# Patient Record
Sex: MALE | URBAN - METROPOLITAN AREA
[De-identification: names, ages, dates, MRNs, and addresses within clinical notes are randomized per-mention and may not be internally consistent; named-entity substitution may affect disease eponyms.]

---

## 2023-07-10 ENCOUNTER — OFFICE VISIT (OUTPATIENT)
Dept: FAMILY MEDICINE | Facility: CLINIC | Age: 53
End: 2023-07-10

## 2023-07-10 VITALS
SYSTOLIC BLOOD PRESSURE: 160 MMHG | RESPIRATION RATE: 16 BRPM | DIASTOLIC BLOOD PRESSURE: 91 MMHG | HEART RATE: 71 BPM | TEMPERATURE: 98.6 F | BODY MASS INDEX: 26.29 KG/M2 | OXYGEN SATURATION: 98 % | WEIGHT: 154 LBS | HEIGHT: 64 IN

## 2023-07-10 DIAGNOSIS — I10 PRIMARY HYPERTENSION: Primary | ICD-10-CM

## 2023-07-10 DIAGNOSIS — R73.9 ELEVATED RANDOM BLOOD GLUCOSE LEVEL: ICD-10-CM

## 2023-07-10 DIAGNOSIS — E11.9 TYPE 2 DIABETES MELLITUS WITHOUT COMPLICATION, WITHOUT LONG-TERM CURRENT USE OF INSULIN (H): ICD-10-CM

## 2023-07-10 PROCEDURE — 82570 ASSAY OF URINE CREATININE: CPT

## 2023-07-10 NOTE — Clinical Note
Hi could you please sign the note from 7/10 for this patient? Thank you! [email or web address]

## 2023-07-11 LAB
CREAT UR-MCNC: 200 MG/DL
MICROALBUMIN UR-MCNC: 16.5 MG/L
MICROALBUMIN/CREAT UR: 8.25 MG/G CR (ref 0–17)

## 2023-07-11 NOTE — PROGRESS NOTES
MEDICINE NOTE    SUBJECTIVE:    Mr. Clarke is a 53 year old male who presents to [facility] today for an elevated blood sugar reading. His non-fasting blood sugar was 200 this morning measured at the [facility]. He reports his blood sugar one year ago was 180. He has noted he is urinating more at night and maybe more so during the day but he is not sure. He also received a dose of the COVID-19 vaccine booster today at the [facility] today.     He reports a medical history of high blood pressure readings but has never been on medication.  He denies dyspnea on exertion, edema or paresthesias or numbness in his legs. He admits to having brief episodes of chest pain. Unfortunately, we did not have a  in clinic and had to rely on a phone  and this part of the history was not well delineated.  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 He has not been working recently has he had a fall at work and [place] [facility] [place]. He still reports some diminished sensation in this left hand and feet. His family history is a bit uncertain also but he may have diabetes in his parents but not his siblings.    He lives with a son and nephew and he takes part in the grocery shopping and cooking with his housemates. His other complaint is itchy eyes for the last 3 months. He denies exposure to any new allergens or animals. He has a history of dry eyes       REVIEW OF SYSTEMS:  Gen: no fevers, night sweats or weight change  Eyes: no vision change, diplopia or red eyes  Ears, Noses, Mouth, Throat: no ringing in ears or hearing change, no epistaxis or nasal discharge, no oral lesions, throat clear  Cardiac: no chest pain, palpitations, or pain with walking  Lungs: no dyspnea, cough, or shortness of breath  GI: no nausea, vomiting, diarrhea or constipation, no abdominal pain  : no change in urine, hematuria, or sexual  "dysfunction  Musculoskeletal: no joint or muscle pain or swelling  Skin: no concerning lesions or moles  Neuro: no loss of strength or sensation, no numbness or tingling, no tremor  Endo: no polyuria or polydipsia, no temperature intolerance  Heme/Lymph: no concerning bumps, no bleeding problems  Allergy: no environmental or drug allergies  Psych: no depression or anxiety    Past Medical History:  No medical history to report    Surgical history:  Posterior C3-C7 Laminectomy and fusion (8/12/2021)    Family History:   Mother: Diabetes  Father: Diabetes     Social History:   Maxwell lives with his son and nephew. He denies smoking marijuana or tobacco and illicit drug use. He drinks six beers a week. He denies difficulty in purchasing food.     OBJECTIVE:  Physical Exam:  BP (!) 160/91 (BP Location: Right arm, Patient Position: Chair, Cuff Size: Adult Regular)   Pulse 71   Temp 98.6  F (37  C)   Resp 16   Ht 1.57 m (5' 1.81\")   Wt 69.9 kg (154 lb)   SpO2 98%   BMI 28.34 kg/m    Repeat blood pressure was 150/85 in his right arm, sitting    Constitutional: no distress, comfortable, pleasant   Eyes: anicteric, normal extra-ocular movements, sclera are not red   .   Cardiovascular: regular rate and rhythm, normal S1 and S2, no murmurs, rubs or gallops, peripheral pulses full and symmetric no edema  Respiratory: clear to auscultation and precussion      Skin: well healed scar on his upper thoracic and lower cervical spine area. The skin on his feet is normal, no callouses or hot spots and some intertriginous maceration. Dermal appendages are intact. We did not have access to a Ten gram monofilament so that needs to be done at his next visit.     Psychological: appropriate mood   Lymphatic: no cervical  lymphadenopathy    ASSESSMENT/PLAN:  -History of high blood glucose readings outside this clinic. This is possibly Type II -Diabetes Mellitus  -Elevated blood pressure readings  -Itchy eyes, most likely due to the " recent air pollution    Plan: We were unable to draw the patients labs tonight. Patient was advised to come back to [facility] on Monday. At that time, we will check a CMP, CBC, A1C, Microalbumin and lipid panel. Once those lab results are back, we will notify him by phone.  He is given a home blood pressure monitor and instructions on how to use it and asked to bring in a log of his readings over the next month.  He is asked to RTC in a month.  If his A1C is high and his renal function adequate, he will be started on Metformin  He is advised to  lubricating eye drops to treat his itchy eyes.   Further risk factor treatment will be started after his results are back. Patient also spoke with the nutrionist [facility] for maintaining a healthy diet and lifestyle.     Med Clinician: DENICE Weber   Preceptor: Galen Lewis MD    In supervising the medical student, I repeated the exam documented above.  I have reviewed and verified the student s documentation.  Supervising Provider: Galen Lewis M.D.

## 2023-07-11 NOTE — NURSING NOTE
"[facility] Nursing Progress Note    Chief complaint: Pts non fasting blood sugar was 200 at his clinic this morning and was directed here. He also stated his eyes have been bothering him more recently. Pt has a history of hypertension, but has never taken any medication.     Blood pressure cuff being lended for pt to monitor BP at home. Education provided. Forms given. Pt was able to demonstrate proper BP reading.      Objective:  BP (!) 160/91 (BP Location: Right arm, Patient Position: Chair, Cuff Size: Adult Regular)   Pulse 71   Temp 98.6  F (37  C)   Resp 16   Ht 1.62 m (5' 3.78\")   Wt 69.9 kg (154 lb)   SpO2 98%   BMI 26.62 kg/m      Social History:    PHQ Score:    PHQ2 : 0    Nutrition Screen:   Pt reports sometimes not being able to afford balanced meals.    Referral to nutrition needed?:    Yes.      Nursing Clinician: Jessie Goetz  Nursing Preceptor: Sirisha Monge RN        "

## 2023-07-11 NOTE — PATIENT INSTRUCTIONS
Resumen de la Visita    Nombre del Paciente: Maxwell Clarke  MRN: [id number]    Fecha de la [place] 10 de chica, 2023     Dianostico medico principal: Esperando por los resultados de laboratorio    Recomendaciones/Instrucciones del Medico: Hoy vamos a recoger laboratorios y llamar a usted en 1-2 acevedo con los resultados. Queremos obtenerlos antes de contiuar con tratamiento. Le damos un monitor de presion aterial hoy. Por favor tome leila prueba de presion arterial 3-4 veces por semana mientras esta sentado. Escriba los numeros en papel o en marcus telefono y traerlos en marcus siguiente visita. Regrese al [facility] en 3 o 4 semanas con los numeros y el monitor para devolverlo. Tambien simón la informacion del nutricionista por favor.    Estudios de Laboratorio: hemograma comopleto, prueba de microalbumina en orina, panel de lipidos, hemoglobina A1c, panel metabolico completo    Seguimiento/Resultados: Vamos a llamarle en 1 o 2 acevedo con los resultados. Es muy importante contestas la llamada. Es posible que el judy es de telefono fuera del area.     Medico: Dr. Lewis

## 2023-07-12 NOTE — PHARMACY
"[facility] Progress Note    Chief complaint: hyperglycemia. Maxwell Clarke also stated eye irritation and hypertension.    Last date of full med: 7/10/2023    Subjective:  Mr. Clarke is a 53yoM coming in because his non fasting blood sugar when checked this morning was 200 and he was directed here. He reports having eye irritation for the past 3 months, hypertension, and a family history of both his parents having diabetes. He reports no tobacco or illicit drug use. He does rarely drink coffee when it's cold and drinks about 6 beers per week.    No current outpatient medications on file.         Objective:  BP (!) 160/91 (BP Location: Right arm, Patient Position: Chair, Cuff Size: Adult Regular)   Pulse 71   Temp 98.6  F (37  C)   Resp 16   Ht 1.62 m (5' 3.78\")   Wt 69.9 kg (154 lb)   SpO2 98%   BMI 26.62 kg/m        Assessment:   -History of high blood glucose readings outside this clinic. This is possibly Type II -Diabetes Mellitus  -Elevated blood pressure readings  -Itchy eyes, most likely due to the recent air pollution    DTP: Indication- needs additional drug therapy for hypertension  Rationale: The patient reports a history of hypertension and has a reading >140/90 mm Hg indicative of hypertension per the 2017 ACC/AHA HTN guidelines which recommend that the patient should take medications to manage his hypertension.      DTP: Indication - needs additional drug therapy for high A1C  Rationale: If the patient has a high A1C of over 6.5 this would be indicative of him having diabetes that should be treated per the ADA / FDA guidelines .    Plan:  1. Draw labs for CMP, CBC, A1C, Microalbumin, and a lipid panel to assess pts health. If his A1C is high and his renal function adequate, he will be started on Metformin.  2. Use lubricating eye drops for ocular irritation  3. RTC in one month after using home blood pressure monitor and bring readings.    Follow-Up:  Pt to return in 1 month to discuss lab " values and blood pressure readings. If appropriate, medications for hypertension, diabetes and high cholesterol may be started.    Pharmacy Clinician: Steffany Crawford  Pharmacy Preceptor: Imani Mcneal RPH    _____________________________  Preceptor Use Only:  In supervising the student, I have reviewed and verified the student's documentation and found it to be correct and complete.   Preceptor Signature: Imnai Mcneal PharmD, BCIDP

## 2023-07-13 ENCOUNTER — LAB (OUTPATIENT)
Dept: FAMILY MEDICINE | Facility: CLINIC | Age: 53
End: 2023-07-13

## 2023-07-13 DIAGNOSIS — R73.9 ELEVATED RANDOM BLOOD GLUCOSE LEVEL: ICD-10-CM

## 2023-07-13 DIAGNOSIS — I10 PRIMARY HYPERTENSION: ICD-10-CM

## 2023-07-13 PROCEDURE — 83036 HEMOGLOBIN GLYCOSYLATED A1C: CPT

## 2023-07-13 PROCEDURE — 80053 COMPREHEN METABOLIC PANEL: CPT

## 2023-07-13 PROCEDURE — 85027 COMPLETE CBC AUTOMATED: CPT

## 2023-07-13 PROCEDURE — 80061 LIPID PANEL: CPT

## 2023-07-14 LAB
ALBUMIN SERPL BCG-MCNC: 4.4 G/DL (ref 3.5–5.2)
ALP SERPL-CCNC: 114 U/L (ref 40–129)
ALT SERPL W P-5'-P-CCNC: 47 U/L (ref 0–70)
ANION GAP SERPL CALCULATED.3IONS-SCNC: 12 MMOL/L (ref 7–15)
AST SERPL W P-5'-P-CCNC: 39 U/L (ref 0–45)
BILIRUB SERPL-MCNC: 0.4 MG/DL
BUN SERPL-MCNC: 9.8 MG/DL (ref 6–20)
CALCIUM SERPL-MCNC: 9.6 MG/DL (ref 8.6–10)
CHLORIDE SERPL-SCNC: 100 MMOL/L (ref 98–107)
CHOLEST SERPL-MCNC: 205 MG/DL
CREAT SERPL-MCNC: 0.6 MG/DL (ref 0.67–1.17)
DEPRECATED HCO3 PLAS-SCNC: 25 MMOL/L (ref 22–29)
ERYTHROCYTE [DISTWIDTH] IN BLOOD BY AUTOMATED COUNT: 11.9 % (ref 10–15)
GFR SERPL CREATININE-BSD FRML MDRD: >90 ML/MIN/1.73M2
GLUCOSE SERPL-MCNC: 230 MG/DL (ref 70–99)
HBA1C MFR BLD: 7.6 %
HCT VFR BLD AUTO: 43 % (ref 40–53)
HDLC SERPL-MCNC: 43 MG/DL
HGB BLD-MCNC: 14.9 G/DL (ref 13.3–17.7)
LDLC SERPL CALC-MCNC: 116 MG/DL
MCH RBC QN AUTO: 32.3 PG (ref 26.5–33)
MCHC RBC AUTO-ENTMCNC: 34.7 G/DL (ref 31.5–36.5)
MCV RBC AUTO: 93 FL (ref 78–100)
NONHDLC SERPL-MCNC: 162 MG/DL
PLATELET # BLD AUTO: 168 10E3/UL (ref 150–450)
POTASSIUM SERPL-SCNC: 3.8 MMOL/L (ref 3.4–5.3)
PROT SERPL-MCNC: 7.3 G/DL (ref 6.4–8.3)
RBC # BLD AUTO: 4.61 10E6/UL (ref 4.4–5.9)
SODIUM SERPL-SCNC: 137 MMOL/L (ref 136–145)
TRIGL SERPL-MCNC: 232 MG/DL
WBC # BLD AUTO: 6.1 10E3/UL (ref 4–11)

## 2023-07-18 PROBLEM — E11.9 DIABETES MELLITUS, TYPE 2 (H): Status: ACTIVE | Noted: 2023-07-18

## 2023-07-24 ENCOUNTER — DOCUMENTATION ONLY (OUTPATIENT)
Dept: FAMILY MEDICINE | Facility: CLINIC | Age: 53
End: 2023-07-24

## 2023-07-24 NOTE — PROGRESS NOTES
I have tried reaching Mr. Clarke by telephone ([phone]) multiple times and have been unable to reach him. His most recent A1C indicates he has diabetes and he needs to start Metformin. He also needs to start a statin due to his elevated triglycerides. This case was discussed with Dr. Galen Lewis. I will continue to reach out to Mr. Clarke.     DENICE Weber  [facility] Medical Student, MS2  [email or web address]

## 2023-09-21 ENCOUNTER — OFFICE VISIT (OUTPATIENT)
Dept: FAMILY MEDICINE | Facility: CLINIC | Age: 53
End: 2023-09-21

## 2023-09-21 DIAGNOSIS — I10 BENIGN ESSENTIAL HYPERTENSION: Primary | ICD-10-CM

## 2023-09-21 DIAGNOSIS — E11.9 DIABETES MELLITUS WITHOUT COMPLICATION (H): ICD-10-CM

## 2023-09-21 DIAGNOSIS — E11.9 TYPE 2 DIABETES MELLITUS (H): ICD-10-CM

## 2023-09-21 DIAGNOSIS — E78.5 HYPERLIPIDEMIA LDL GOAL <100: ICD-10-CM

## 2023-09-21 RX ORDER — LOSARTAN POTASSIUM 25 MG/1
25 TABLET ORAL DAILY
Qty: 30 TABLET | Refills: 0 | Status: SHIPPED | OUTPATIENT
Start: 2023-09-21 | End: 2024-01-25

## 2023-09-21 RX ORDER — ATORVASTATIN CALCIUM 20 MG/1
10 TABLET, FILM COATED ORAL DAILY
Qty: 15 TABLET | Refills: 0 | Status: SHIPPED | OUTPATIENT
Start: 2023-09-21 | End: 2024-01-25

## 2023-09-22 NOTE — PROGRESS NOTES
[facility] Pharmacy Progress Note    Chief complaint: Hypertension    Last date of full med: 7/10    Subjective:  Luis presents to the clinic with a blood pressure log and requests medications to treat the hypertension. He was given a blood pressure cuff from [facility] at his last visit and he takes his blood pressures in the morning. He averaged 140-160 mmHg systolic and 80-95 mmHg diastolic.     At his last visit, labs for A1c were drawn and he had an A1c of 7.6% and a spot blood glucose of 230 mg/dL. For his newly diagnosed diabetes, he was prescribed metformin 500 mg 1 tablet by mouth BID. He stated that he had not yet picked up or started this medication yet. He has no history of previous clinical ASCVD or cardiac events.     He has no family history of heart disease or any other cardiovascular disease. He does have a family history of T2DM. Current diet and exercise habits were not assessed at this visit.      Current Outpatient Medications   Medication Sig Dispense Refill     atorvastatin (LIPITOR) 20 MG tablet Take 0.5 tablets (10 mg) by mouth daily 15 tablet 0     losartan (COZAAR) 25 MG tablet Take 1 tablet (25 mg) by mouth daily 30 tablet 0     metFORMIN (GLUCOPHAGE) 500 MG tablet Take 1 tablet (500 mg) by mouth 2 times daily (with meals) 60 tablet 4         Objective:  There were no vitals taken for this visit.    BP: 181/119 mmHg (1st); 138/86 mmHg (2nd)    Assessment:     Hypertension: Uncontrolled  He has Stage 2 Hypertension that can be treated with first line agents including ACEis, ARBs, thiazide diuretics, and CCBs per the 2017 ACC/AHA guidelines for hypertension. Most recent K+ level was 3.8 mmol/L on 7/13.    Type 2 Diabetes Mellitus: Uncontrolled  Recent, and only, A1c value of 7.6% does not meet recommended goal of <7%. Initial therapy is recommended and has already been prescribed. The patient has not yet started the medication. Patient may also benefit from dietary changes and increased  exercise.    ASCVD Prevention: Uncontrolled  Per the 2019 AHA guidelines for primary prevention, a moderate intensity statin is indicated for ASCVD prevention. Therapy options include pravastatin, simvastatin, atorvastatin, and rosuvastatin. Patient may also benefit from ASA 81 mg daily but an ASCVD risk score was not calculated and should be calculated on follow-up with new labs.    Plan:  1. Return to clinic on Monday to  medications.  2. Initiate Losartan 25 mg daily  3. Initiate atorvastatin 10 mg daily  4. Initiate metformin 500 mg twice daily  5. Recommend dietary changes and 150 minutes moderate exercise per week.    Follow-Up:  Follow-up in 1 month to assess for side effects, efficacy (blood pressure measurement), and new labs (BMP, lipid panel)    Pharmacy Clinician: Kin Richards  Pharmacy Preceptor: Caryl Coon PharmD    _____________________________  Preceptor Use Only:  In supervising the student, I have reviewed and verified the student's documentation and found it to be correct and complete.   Preceptor Signature:

## 2024-01-11 ENCOUNTER — OFFICE VISIT (OUTPATIENT)
Dept: FAMILY MEDICINE | Facility: CLINIC | Age: 54
End: 2024-01-11

## 2024-01-11 VITALS
RESPIRATION RATE: 16 BRPM | SYSTOLIC BLOOD PRESSURE: 150 MMHG | TEMPERATURE: 98.3 F | DIASTOLIC BLOOD PRESSURE: 90 MMHG | OXYGEN SATURATION: 98 % | BODY MASS INDEX: 26.46 KG/M2 | WEIGHT: 158.8 LBS | HEIGHT: 65 IN | HEART RATE: 63 BPM

## 2024-01-11 DIAGNOSIS — E78.5 HYPERLIPIDEMIA LDL GOAL <100: ICD-10-CM

## 2024-01-11 DIAGNOSIS — R00.2 PALPITATIONS: ICD-10-CM

## 2024-01-11 DIAGNOSIS — E11.9 TYPE 2 DIABETES MELLITUS WITHOUT COMPLICATION, WITHOUT LONG-TERM CURRENT USE OF INSULIN (H): Primary | ICD-10-CM

## 2024-01-11 ASSESSMENT — PAIN SCALES - GENERAL: PAINLEVEL: EXTREME PAIN (8)

## 2024-01-12 NOTE — PROGRESS NOTES
"MEDICINE NOTE    SUBJECTIVE:      Chief Complaint: Follow-up on meds, bilateral eye pain    HPI: Maxwell Clarke is a 53 year old with a recent diagnoses of diabetes and hypertension who presents to the clinic for med follow-up as well as bilateral eye pain. The patient was diagnosed with hypertension and diabetes at this clinic on 7/10/23 with A1C of 7.6% and a blood glucose of 230 mg/dL. The patient came back to the clinic on 9/21/23 for follow-up on his lab results.The client was prescribed metformin 500 mg twice a day, losartan 25 mg once a day, atorvastatin 10 mg once a day. The patient was supposed to follow up a month after starting his medications, but comes for follow-up today. The patient reports that he has taken his medications as instructed for the full month; however, his bottles still had about two weeks worth of medications. The patient denies any diarrhea, lightheadedness while he was taking the medications.The patient has also been using prednisone eye drops which he received about a year and a half ago. The patient has also been using tetracycline cream for his eye pain which he received from a family member. He reported waking up a few days ago with his left eye shut and using the prednisone eye drops helped open it up.  The patient has been having increasingly worsening eye pain, left worse than left. He rates the pain 8/10 on the left and 5/10 on the right. He has been having trouble reading due to his vision blurriness but denies having trouble with walking or falling at home.     The client walks about 10-20 daily when the weather is warmer, but has not been going on his walks once the weather has gotten cold. He reports having tachycardia and palpitations with walking as well as feeling like his chest is \"suffocating.\" The pain comes and goes, nothing alleviates or worsens it, lasts for about half an hour. He also has been having his chest symptoms with rest. The patient denies diaphoresis, " "radiating pain, lightheadedness during his chest pain episodes.    The patient has been getting 2-3 hours of sleep a night and cannot stay asleep. He denies having chest pain or palpitations upon waking up.     REVIEW OF SYSTEMS:  Gen: no fevers, no weight change  Eyes: vision blurry on left eye, no vision changes in right eye, sclera is erythematous   Cardiac: palpitations and tachycardia that lasts for half an hour  Lungs: no dyspnea, cough, or shortness of breath  GI: no nausea, vomiting, diarrhea or constipation, no abdominal pain  : no change in urine, hematuria  Neuro: tingling, numbness and loss of strength preset on left thumb and index finger due to back surgery   Endo: no polyuria or polydipsia, no temperature intolerance  Allergy: no environmental or drug allergies  Psych: no depression or anxiety    Past Medical History:  Diabetes  Hypertension    Past Surgical History:   Posterior C3-C7 Laminectomy and Fusion     Family History:   Mother: Diabetes  Father: Diabetes    OBJECTIVE:  Physical Exam:  BP (!) 150/90 (BP Location: Right arm, Patient Position: Sitting, Cuff Size: Adult Regular)   Pulse 63   Temp 98.3  F (36.8  C) (Oral)   Resp 16   Ht 1.64 m (5' 4.57\")   Wt 72 kg (158 lb 12.8 oz)   SpO2 98%   BMI 26.78 kg/m    Constitutional: no distress, comfortable, pleasant   Eyes: anicteric, normal extra-ocular movements, scleral erythema present, left worse than right  Ears, Nose and Throat: tympanic membranes clear, nose clear and free of lesions, throat clear, neck supple with full range of motion, no thyromegaly.   Cardiovascular: regular rate and rhythm, normal S1 and S2, no murmurs, rubs or gallops, peripheral pulses full and symmetric   Respiratory: clear to auscultation, no wheezes or crackles, normal breath sounds    Skin: no concerning lesions, no jaundice. Healed scar on upper thoracic and lower cervical spine.   Psychological: appropriate mood   Lymphatic: no cervical  " lymphadenopathy    ASSESSMENT/PLAN:  Diagnoses and all orders for this visit:    Type 2 diabetes mellitus without complication, without long-term current use of insulin (H)  -     Basic metabolic panel  -     Hemoglobin A1c    Hyperlipidemia LDL goal <100  -     Lipid panel reflex to direct LDL Fasting    Palpitations  -     CBC with platelets  -     TSH with free T4 reflex      Due to the patient's history of diabetes, BMP and hemoglobin A1C were drawn. CBC and TSH were also drawn for palpitations. It was explained to the patient to keep using the current medications he has: Metformin 500 mg twice daily, losartan 25 mg once daily, atorvastatin was changed to 20 mg once daily. The patient was instructed to follow up at this clinic on Thursday for lab results. Adjustments to his medication regimen will be discussed with the patient based on his lab results.     Med Clinician: Marnie Allison  Preceptor: Dr. Ke Mcbride MD    In supervising the medical student, I repeated the exam documented above.  I have reviewed and verified the student s documentation.  Supervising Provider: Buster Mcbride MD

## 2024-01-12 NOTE — PATIENT INSTRUCTIONS
Patient Visit Summary    Patient Name: Maxwell Clarke  MRN: [id number]    Date of Visit: 1/11/2024    Principle Diagnosis:   Diabetes, Hypertension, Hyperlipidemia    Physician's Recommendations:    1) Start taking your medications again:  Losartan, 25 mg, once daily   Atorvastatin, 10 mg, once daily  Metformin, 500 mg, twice daily    2) Return to the clinic in one week for follow up on your medications and lab results.    3) Your medical needs are better suited for a clinic with more resources. Consider making an appointment with an eye doctor at [facility] or [facility]. We recommend you have a thorough / dilated eye exam. If you are unable to see someone elsewhere, we have eye care night here on February 29th.      Lab Tests Performed:     Today your blood was drawn for:  Basic Metabolic Panel  Comprehensive Blood Count  Lipid Panel  Thyroid Stimulating Hormone  A1c      Follow Up/Results:   Please return to our clinic in one week to refill your prescription and discuss your blood test results.    Referrals and Instructions:   To manage your health long term, we recommend that you establish care with a primary care provider at [facility]. We've attached information about this clinic. You can receive care here for free and without insurance or citizenship/ documentation.   [phone]    Another option is [facility]:   [email or web address]  [address] [place] [address] [phone]      Physician: Dr. Mcbride    Resumen de la Visita    Nombre del Paciente: Maxwell Clarke  MRN: [id number]    Fecha de la [place]: 1/11/2024  Dianostico medico principal:     Diabetes, Hipertensión, Hiperlipidemia    Recomendaciones/Instrucciones del Medico:     1) Comience a bertin karson medicamentos nuevamente:  Losartán, 25 mg, leila tableta leila vez por día  Atorvastatina, 10 mg, leila tableta leila vez por día  Metformina, 500 mg, leila tableta dos veces por día con comida    2) Regrese a  la clínica en leila semana para pradip seguimiento a unique medicamentos y resultados de laboratorio.    3) Unique necesidades médicas se adaptan mejor a leila clínica con más recursos. Considere programar leila jd con un oftalmólogo en [facility] o [facility]. Le recomendamos que se realice un examen ocular completo/con dilatación de las pupilas. Si no puede shayna a alguien en otro lugar, tendremos leila noche de atención oftalmológica aquí el 29 de febrero.    Estudios de Laboratorio:     Hoy te extrajeron wayne para:  Panel metabólico básico  Conteo sanguíneo completo  Panel de lípidos  Hormona estimulante de la tiroides  A1c      Seguimiento/Resultados:   Regrese a nuestra clínica en leila semana para renovar marcus receta y analizar los resultados de unique análisis de wayne.    Referencias e Instrucciones:  Para gestionar marcus mai a skyler plazo, le recomendamos que establezca atención con un proveedor de atención primaria en [facility]. Adjuntamos información sobre esta clínica. Puede recibir atención aquí de forma gratuita y sin seguro ni ciudadanía/documentación.  [phone]    Otra opción es [facility]:  [email or web address]  [address]  [place] [address]  [phone]      Medico: Dr. Ke Mcbride

## 2024-01-12 NOTE — PROGRESS NOTES
"NUTRITION NOTE    Patient Name: Maxwell Clarke   MRN: [id number]    Reason for assessment: Patient wanted to ask basic nutrition questions about eating healthy.    Anthropometrics  Admit Weight:   Height: 5' 4.567\"   Current weight: 158 lbs 12.8 oz   Body mass index is 26.78 kg/m .     Assessment  Relevant nutritional labs: 150/90 BP BMI 26.78    Nutrition problem: food,nutrition, and nutriiton related knowledge deficit    Related to: limited knowledge about what a healthy diet is    As indicated by: self report    Nutrition Counseling: What a healthy diet is, the role of sodium in hypertension, the benefits of fruit and vegetable and intake and how to manage sodium intake when eating out. The main emphasis of the conversation is try to find a balance when it comes to food.    Those present during counseling: Only the nutrition clinician (Avinash)    Instructed on: Basic principles of what a balanced meal looks like and things to be mindful of when eating out, especially as it relates to sodium intake and his hypertension.    Understanding of diet demonstrated: Patient seems to have a better understanding about basic nutrition principles than before.    Predicted compliance: N/A    Plan: Increase fruit and vegetable intake with each meal and eat other comfort items with moderation.    Nutrition Clinician: Ortiz Hoffman  Preceptor: JANESSA Macias    In supervising the nutrition student, I repeated the exam documented above.  I have reviewed and verified the student s documentation.  Supervising Provider: Ondina Simmons RDN/RAE    "

## 2024-01-12 NOTE — PROGRESS NOTES
"S: \"I toss and turn for hours\"     O: Client seen today, 01-, by OT at [facility] for difficulties falling and staying asleep. Observed with burrowed eyebrows. Per visit, client explains it takes 2 hours to fall asleep and often wakes up in the middle of the night for multiple hours. Client expressed caffeine intake at dinner time and sedentary life style due to back injury and pain.     A: Impairment in client's ability to participate in rest and sleep. Client presents with difficulty falling asleep and staying asleep. Caffeine may be impairing ability to fall asleep because of late in the day consumption. Lack of movement may be causing restlessness. Recommendation for continued OT to address sleep difficulties further, if suggestions do not show progress.     P: Client was given recommendation to remove screen time two hours prior to bedtime. He was also recommended to limit caffeine intake by 2 pm to help with ability to fall asleep and stay asleep. Client was also recommended to incorporate movement into his daily routine and create a specific sleep and wake schedule. If improvement is not seen, client would benefit from additional OT services.       ETHEL Saucedo, 01-  Bisi Arteaga, SHARYN/L, CLT, 1/11/2024  ETHEL Goldman, 01/11/2024  "

## 2024-01-12 NOTE — NURSING NOTE
"[facility] Nursing Progress Notes    Primary Concern: Patient is a 52 yo male with a history of hypertension, type 2 Diabetes Mellitus, and cataracts. He came to the clinic today for labs and a med follow up. Originally was prescribed medications in September of 2023 and was unable to follow up until now. Patient also spoke of L eye pain and ranked it an 8/10 and described it as burning and watery.     Onset: Unclear, had cataract surgery ~2 years ago  Location: L eye (R eye less painful @ 5/10)  Duration: 6 months  Severity: 8/10    Objective: Temp: 98.3  F (36.8  C) Temp src: Oral BP: (!) 150/90 (manual, First two attempts were automatic at 178/102, and 163/97 respectively) Pulse: 63   Resp: 16 SpO2: 98 % Height: 164 cm (5' 4.57\") Weight: 72 kg (158 lb 12.8 oz)    at      Social History  Occupation: None  Physical Activity/Lifestyle: Walks 10-20 minutes daily    PHQ Score  PHQ2: 0    Nutrition Screener  Q1 Answer: Never True  Q2 Answer: Sometimes True    Nutrition referred    Nursing Clinician: Yvette Perez    \"This patient visit was presented to the preceptors, and the plan of care was agreed upon by the team.\"   "

## 2024-01-12 NOTE — PROGRESS NOTES
"[facility] Progress Note    Chief complaint: Med follow up and bilateral eye pain    Last date of full med: 07/10/2023    Subjective:  Maxwell Clarke is a 53 year old male that presents to the clinic for a medication follow up and bilateral eye pain. Patient rates pain at 8/10 in left eye and 5/10 in his right eye, states left eye is the main concern today. He states that have been hurting for the last 6 months but that it was much worse this morning. He also states that the burning sensation is not new but the blurriness is new. He has been using a prednisone acetate 1% eye drop solution, 2 drops both eyes, for the last day or two which he got as a prescription seemingly in April of 2023. He has also been using a Terramycin polymixin B eye ointment that he got from a family member as well. He also reports for medication follow up for primary hypertension, type 2 diabetes, and ASCVD prevention. Patient had been taking atorvastatin 20 mg 1/2 tab once daily, losartan 25 mg once daily, and metformin 500 mg twice daily. These are medications from prior visit on 09/21/2023 and was supposed to follow up after one month but was unable to until today. He states he only took the medications for one month and had no issues or adverse effects, however there were still a lot of medications left in all three bottles. The only new potential issue the patient mentioned was having palpitations every couple days, sometimes around the house and sometimes while out walking. He states they can last up to 30 minutes and he is unsure of why they occur. His diet consists of a lot of eggs, chicken, and other meats, and sometimes fruits and vegetables. Patient also reported some pain and weakness in lower back and left hand, had prior back surgery in April of 2023, but reports this not affecting daily living. Patient also reports having trouble sleeping, only 2-3 hours each night, but states that he feels normal most of the time, \"like I " "actually slept 8 hours.\" Patient reports vaccinations including flu and covid are up-to-date. Patient also reports limited caffeine and alcohol use, 1-2 drinks per week for both, and no tobacco or illicit drug use.      Current Outpatient Medications   Medication Sig Dispense Refill     atorvastatin (LIPITOR) 20 MG tablet Take 0.5 tablets (10 mg) by mouth daily 15 tablet 0     losartan (COZAAR) 25 MG tablet Take 1 tablet (25 mg) by mouth daily 30 tablet 0     metFORMIN (GLUCOPHAGE) 500 MG tablet Take 1 tablet (500 mg) by mouth 2 times daily (with meals) 60 tablet 0     metFORMIN (GLUCOPHAGE) 500 MG tablet Take 1 tablet (500 mg) by mouth 2 times daily (with meals) 60 tablet 4         Objective:  BP (!) 150/90 (BP Location: Right arm, Patient Position: Sitting, Cuff Size: Adult Regular)   Pulse 63   Temp 98.3  F (36.8  C) (Oral)   Resp 16   Ht 1.64 m (5' 4.57\")   Wt 72 kg (158 lb 12.8 oz)   SpO2 98%   BMI 26.78 kg/m      Assessment:     Hypertension: Uncontrolled  DTP: Needs Additional Therapy: Poor adherence  Rationale: According to 2017 ACC/AHA guidelines the patient has stage 2 hypertension, he has not been taking any of his medications over the last couple months and has not eating well recently due to his nephew staying with him. Patient would benefit from restarting his medication, increasing dosage up to 50 mg daily, and even potentially adding another first line medication like hydrochlorothiazide. He would also benefit from an improvement in diet and exercise.    Type 2 Diabetes Mellitus: Uncontrolled/unsure  DTP: Needs Additional Therapy: Poor adherence  Rationale: The only A1c value he has taken was 7.6% in July 2023, which is above the recommended value of <7%. Patient is due for another reading since he has been diagnosed and it has been about 6 months, is already on initial recommended therapy, could benefit from increased dose or additional medication depending on new value.    ASCVD Prevention: " Uncontrolled  DTP: Needs Additional Therapy: Poor adherence  Rationale: According to 2019 AHA guidelines for primary prevention - diabetes, the patient is indicated for a moderate intensity statin. A risk assessment could be done after receiving cholesterol levels to determine if he should be switched a high intensity statin.    Bilateral eye pain: Uncontrolled  DTP: Needs Additional Therapy: untreated condition   Rationale: Unsure of what is causing eye pain and whether or not he should be taking the eye medications. Refer patient to another clinic like [facility] who can provide the patient with a comprehensive eye exam. The next Optho day at [facility] is at the end of February.     Plan:  1. Plan to return to clinic next Thursday, 01/18, to follow up for labs and medications.  2. Since patient has medications left over and the pharmacy is closed. Re-initiate losartan 25 mg daily and metformin 500 mg twice daily.  3. Increase atorvastatin to 20 mg daily.  4. Plan visit to [facility] for eye and palpitation follow-up.  5. Discontinue Terramycin ointment and continue taking Prednisolone drops until [facility] visit.  6. Recommend 150 minutes of moderate exercise each and week and dietary changes.    Follow-Up:  We should follow-up over the phone once lab results are returned to ensure patient returns to clinic in one weeks time on 01/18 and to update patient on health status.    Pharmacy Clinician: Pritesh Krishnamurthy  Pharmacy Preceptor: N/A, no pharm preceptor nena  -reviewed by Buster Mcbride MD      _____________________________  Preceptor Use Only:  In supervising the student, I have reviewed and verified the student's documentation and found it to be correct and complete.   Preceptor Signature: N/A

## 2024-01-18 ENCOUNTER — APPOINTMENT (OUTPATIENT)
Dept: FAMILY MEDICINE | Facility: CLINIC | Age: 54
End: 2024-01-18

## 2024-01-18 LAB
ANION GAP SERPL CALCULATED.3IONS-SCNC: 5 MMOL/L (ref 7–15)
BUN SERPL-MCNC: 11.5 MG/DL (ref 6–20)
CALCIUM SERPL-MCNC: 9.8 MG/DL (ref 8.6–10)
CHLORIDE SERPL-SCNC: 100 MMOL/L (ref 98–107)
CHOLEST SERPL-MCNC: 142 MG/DL
CREAT SERPL-MCNC: 0.63 MG/DL (ref 0.67–1.17)
DEPRECATED HCO3 PLAS-SCNC: 31 MMOL/L (ref 22–29)
EGFRCR SERPLBLD CKD-EPI 2021: >90 ML/MIN/1.73M2
ERYTHROCYTE [DISTWIDTH] IN BLOOD BY AUTOMATED COUNT: 11.9 % (ref 10–15)
FASTING STATUS PATIENT QL REPORTED: ABNORMAL
GLUCOSE SERPL-MCNC: 119 MG/DL (ref 70–99)
HBA1C MFR BLD: 7.4 %
HCT VFR BLD AUTO: 42.3 % (ref 40–53)
HDLC SERPL-MCNC: 42 MG/DL
HGB BLD-MCNC: 15 G/DL (ref 13.3–17.7)
HOLD SPECIMEN: NORMAL
HOLD SPECIMEN: NORMAL
LDLC SERPL CALC-MCNC: 34 MG/DL
MCH RBC QN AUTO: 32.5 PG (ref 26.5–33)
MCHC RBC AUTO-ENTMCNC: 35.5 G/DL (ref 31.5–36.5)
MCV RBC AUTO: 92 FL (ref 78–100)
NONHDLC SERPL-MCNC: 100 MG/DL
PLATELET # BLD AUTO: 253 10E3/UL (ref 150–450)
POTASSIUM SERPL-SCNC: 4.8 MMOL/L (ref 3.4–5.3)
RBC # BLD AUTO: 4.62 10E6/UL (ref 4.4–5.9)
SODIUM SERPL-SCNC: 136 MMOL/L (ref 135–145)
TRIGL SERPL-MCNC: 331 MG/DL
TSH SERPL DL<=0.005 MIU/L-ACNC: 1.53 UIU/ML (ref 0.3–4.2)
WBC # BLD AUTO: 7.9 10E3/UL (ref 4–11)

## 2024-01-18 PROCEDURE — 83036 HEMOGLOBIN GLYCOSYLATED A1C: CPT

## 2024-01-18 PROCEDURE — 80048 BASIC METABOLIC PNL TOTAL CA: CPT

## 2024-01-18 PROCEDURE — 84443 ASSAY THYROID STIM HORMONE: CPT

## 2024-01-18 PROCEDURE — 85014 HEMATOCRIT: CPT

## 2024-01-18 PROCEDURE — 80061 LIPID PANEL: CPT

## 2024-01-25 ENCOUNTER — OFFICE VISIT (OUTPATIENT)
Dept: FAMILY MEDICINE | Facility: CLINIC | Age: 54
End: 2024-01-25

## 2024-01-25 DIAGNOSIS — I10 BENIGN ESSENTIAL HYPERTENSION: ICD-10-CM

## 2024-01-25 DIAGNOSIS — E78.5 HYPERLIPIDEMIA LDL GOAL <100: ICD-10-CM

## 2024-01-25 DIAGNOSIS — E11.9 DIABETES MELLITUS WITHOUT COMPLICATION (H): ICD-10-CM

## 2024-01-25 RX ORDER — LOSARTAN POTASSIUM 25 MG/1
25 TABLET ORAL DAILY
Qty: 30 TABLET | Refills: 0 | Status: SHIPPED | OUTPATIENT
Start: 2024-01-25

## 2024-01-25 RX ORDER — ATORVASTATIN CALCIUM 20 MG/1
20 TABLET, FILM COATED ORAL DAILY
Qty: 30 TABLET | Refills: 0 | Status: SHIPPED | OUTPATIENT
Start: 2024-01-25

## 2024-01-26 NOTE — PROGRESS NOTES
Per 1/11/24 clinic visit, patient instructed to return to clinic to get refills of medications due to [facility] pharmacy being closed on night of initial clinic visit. Medications were not ordered at that time, per note review, patient instructed to restart losartan and metformin and increase atorvastatin to 20 mg.     Ordered medications for refill tonight. Educated patient to RTC to clinic in 1 month for BP recheck and assess medications.     Caryl Patten, PharmD, BCACP